# Patient Record
Sex: MALE | Race: WHITE | HISPANIC OR LATINO | Employment: OTHER | URBAN - METROPOLITAN AREA
[De-identification: names, ages, dates, MRNs, and addresses within clinical notes are randomized per-mention and may not be internally consistent; named-entity substitution may affect disease eponyms.]

---

## 2023-11-21 ENCOUNTER — HOSPITAL ENCOUNTER (EMERGENCY)
Facility: HOSPITAL | Age: 48
Discharge: HOME | End: 2023-11-21
Attending: EMERGENCY MEDICINE

## 2023-11-21 VITALS
OXYGEN SATURATION: 98 % | DIASTOLIC BLOOD PRESSURE: 96 MMHG | HEIGHT: 66 IN | WEIGHT: 232.25 LBS | SYSTOLIC BLOOD PRESSURE: 178 MMHG | TEMPERATURE: 98 F | RESPIRATION RATE: 18 BRPM | HEART RATE: 91 BPM | BODY MASS INDEX: 37.33 KG/M2

## 2023-11-21 DIAGNOSIS — M17.11 PRIMARY OSTEOARTHRITIS OF RIGHT KNEE: ICD-10-CM

## 2023-11-21 DIAGNOSIS — M25.561 KNEE PAIN, RIGHT: Primary | ICD-10-CM

## 2023-11-21 PROCEDURE — 99283 EMERGENCY DEPT VISIT LOW MDM: CPT

## 2023-11-21 PROCEDURE — 25000003 PHARM REV CODE 250: Performed by: NURSE PRACTITIONER

## 2023-11-21 RX ORDER — NAPROXEN 500 MG/1
500 TABLET ORAL
Status: COMPLETED | OUTPATIENT
Start: 2023-11-21 | End: 2023-11-21

## 2023-11-21 RX ORDER — DICLOFENAC SODIUM 50 MG/1
50 TABLET, DELAYED RELEASE ORAL 3 TIMES DAILY PRN
Qty: 15 TABLET | Refills: 0 | Status: SHIPPED | OUTPATIENT
Start: 2023-11-21

## 2023-11-21 RX ADMIN — NAPROXEN 500 MG: 500 TABLET ORAL at 09:11

## 2023-11-22 NOTE — ED PROVIDER NOTES
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 Ambulatory with steady gait.     Review of patient's allergies indicates:  No Known Allergies     HPI   The history is provided by the patient.   Leg Pain   [place] The injury mechanism was torsion (playing soccer). Illness onset: years ago. The pain is present in the right knee. The quality of the pain is described as aching. The pain is at a severity of 3/10. The pain has been Intermittent since onset. Pertinent negatives include no numbness, no inability to bear weight, no loss of motion, no muscle weakness, no loss of sensation and no tingling. He reports no foreign bodies present. The symptoms are aggravated by activity. He has tried nothing for the symptoms. The treatment provided no relief.        PCP: No primary care provider on file.     Past Medical History:  No past medical history on file.     Past Surgical History:  No past surgical history on file.     Family History:  No family history on file.     Social History:  Social History     Tobacco Use    Smoking status: Not on file    Smokeless tobacco: Not on file   Substance and Sexual Activity    Alcohol use: Not on file    Drug use: Not on file    Sexual activity: Not on file         ROS   Review of Systems   Constitutional:  Negative for fever.   HENT:  Negative for sore throat.    Respiratory:  Negative for shortness of breath.    Cardiovascular:  Negative for chest pain.   Gastrointestinal:  Negative for nausea.   Genitourinary:  Negative for dysuria.   Musculoskeletal:  Negative for back pain.        Right knee pain   Skin:  Negative for rash.   Neurological:  Negative for tingling, weakness and numbness.   Hematological:  Does not bruise/bleed easily.       PHYSICAL EXAM     Initial  "Vitals [11/21/23 1936]   BP Pulse Resp Temp SpO2   (!) 178/96 91 18 97.8 °F (36.6 °C) 98 %      MAP       --           Physical Exam    Constitutional: He appears well-developed and well-nourished. He is Obese . No distress.   HENT:   Head: Normocephalic and atraumatic.   Eyes: Conjunctivae are normal. Pupils are equal, round, and reactive to light.   Neck: Neck supple.   Normal range of motion.  Cardiovascular:  Normal rate, regular rhythm and normal heart sounds.           Pulmonary/Chest: Breath sounds normal.   Abdominal: Abdomen is soft. Bowel sounds are normal.   Musculoskeletal:         General: Normal range of motion.      Cervical back: Normal range of motion and neck supple.     Neurological: He is alert and oriented to person, place, and time. No cranial nerve deficit.   Skin: Skin is warm and dry.   Psychiatric: He has a normal mood and affect.          ED COURSE   Procedures  ED ONGOING VITALS:  Vitals:    11/21/23 1936   BP: (!) 178/96   Pulse: 91   Resp: 18   Temp: 97.8 °F (36.6 °C)   TempSrc: Oral   SpO2: 98%   Weight: 105.3 kg (232 lb 4.1 oz)   Height: 5' 6" (1.676 m)         ABNORMAL LAB VALUES:  Labs Reviewed - No data to display      ALL LAB VALUES:        RADIOLOGY STUDIES:  Imaging Results              X-Ray Knee Complete 4 Or More Views Right (Final result)  Result time 11/21/23 20:22:31      Final result by Nick Wayne MD (11/21/23 20:22:31)                   Impression:      As above      Electronically signed by: Nick Wayne  Date:    11/21/2023  Time:    20:22               Narrative:    EXAMINATION:  XR KNEE COMP 4 OR MORE VIEWS RIGHT    CLINICAL HISTORY:  Pain in right knee    TECHNIQUE:  AP, lateral, and Merchant views of the right knee were performed.    COMPARISON:  None    FINDINGS:  No acute fracture or dislocation.  Mild degenerative joint disease.                                                The above vital signs and test results have been reviewed by the emergency " provider.     ED Medications:  Discharge Medication List as of 11/21/2023  9:50 PM        START taking these medications    Details   diclofenac (VOLTAREN) 50 MG EC tablet Take 1 tablet (50 mg total) by mouth 3 (three) times daily as needed., Starting Tue 11/21/2023, Print           Discharge Medications:  Discharge Medication List as of 11/21/2023  9:50 PM        START taking these medications    Details   diclofenac (VOLTAREN) 50 MG EC tablet Take 1 tablet (50 mg total) by mouth 3 (three) times daily as needed., Starting Tue 11/21/2023, Print             Follow-up Information       Schedule an appointment as soon as possible for a visit  with PCP.               [facility] - Emergency Dept..    Specialty: Emergency Medicine  Contact information:  [id number] [place] [id number] [phone]                          I discussed with patient and/or family/caretaker that evaluation in the ED does not suggest any emergent or life threatening medical conditions requiring immediate intervention beyond what was provided in the ED, and I believe patient is safe for discharge. Regardless, an unremarkable evaluation in the ED does not preclude the development or presence of a serious or life threatening condition. As such, patient was instructed to return immediately for any worsening or change in current symptoms.    Pre-hypertension/Hypertension: The pt has been informed that they may have pre-hypertension or hypertension based on a blood pressure reading in the ED. I recommend that the pt call the PCP listed on their discharge instructions or a physician of their choice this week to arrange f/u for further evaluation of possible pre-hypertension or hypertension.       MEDICAL DECISION MAKING                 CLINICAL IMPRESSION       ICD-10-CM ICD-9-CM   1. Knee pain, right  M25.561 719.46   2. Primary osteoarthritis of right knee  M17.11 715.16       Disposition:   Disposition:  Discharged  Condition: Stable         Dario Dixon NP  11/22/23 [id number]

## 2024-05-08 ENCOUNTER — HOSPITAL ENCOUNTER (EMERGENCY)
Facility: HOSPITAL | Age: 49
Discharge: HOME | End: 2024-05-09
Attending: EMERGENCY MEDICINE

## 2024-05-08 VITALS
RESPIRATION RATE: 19 BRPM | TEMPERATURE: 98 F | OXYGEN SATURATION: 97 % | SYSTOLIC BLOOD PRESSURE: 164 MMHG | DIASTOLIC BLOOD PRESSURE: 100 MMHG | HEART RATE: 81 BPM

## 2024-05-08 DIAGNOSIS — T14.8XXA MUSCLE STRAIN: Primary | ICD-10-CM

## 2024-05-08 PROCEDURE — 63600175 PHARM REV CODE 636 W HCPCS: Performed by: NURSE PRACTITIONER

## 2024-05-08 PROCEDURE — 96372 THER/PROPH/DIAG INJ SC/IM: CPT | Performed by: NURSE PRACTITIONER

## 2024-05-08 PROCEDURE — 99285 EMERGENCY DEPT VISIT HI MDM: CPT | Mod: 25

## 2024-05-08 RX ORDER — KETOROLAC TROMETHAMINE 30 MG/ML
30 INJECTION, SOLUTION INTRAMUSCULAR; INTRAVENOUS
Status: COMPLETED | OUTPATIENT
Start: 2024-05-08 | End: 2024-05-08

## 2024-05-08 RX ORDER — METHOCARBAMOL 750 MG/1
750 TABLET, FILM COATED ORAL 4 TIMES DAILY
Qty: 40 TABLET | Refills: 0 | Status: SHIPPED | OUTPATIENT
Start: 2024-05-08 | End: 2024-05-18

## 2024-05-08 RX ORDER — IBUPROFEN 800 MG/1
800 TABLET ORAL EVERY 6 HOURS PRN
Qty: 20 TABLET | Refills: 0 | Status: SHIPPED | OUTPATIENT
Start: 2024-05-08

## 2024-05-08 RX ORDER — ORPHENADRINE CITRATE 30 MG/ML
60 INJECTION INTRAMUSCULAR; INTRAVENOUS
Status: COMPLETED | OUTPATIENT
Start: 2024-05-08 | End: 2024-05-08

## 2024-05-08 RX ADMIN — KETOROLAC TROMETHAMINE 30 MG: 30 INJECTION, SOLUTION INTRAMUSCULAR at 11:05

## 2024-05-08 RX ADMIN — ORPHENADRINE CITRATE 60 MG: 30 INJECTION, SOLUTION INTRAMUSCULAR; INTRAVENOUS at 11:05

## 2024-05-08 NOTE — Clinical Note
"Julius Vizcaino" Juan F De La Torre was seen and treated in our emergency department on 5/8/2024.  He may return to work on 05/11/2024.       If you have any questions or concerns, please don't hesitate to call.      Errol Renae NP"

## 2024-05-09 NOTE — ED PROVIDER NOTES
Encounter Date: 5/8/2024       History     Chief Complaint   Patient presents with    Back Pain     Pt c/o left side lower back pain that started yesterday. Pt states he was trying to climb out of a container.      Patient is a 49-year-old male who presents with left lower back pain.  Onset of symptoms today.  Reports trying to climb out of a dumpster and pulled a muscle in his back.  Denies any bowel or bladder incontinence, saddle anesthesia or numbness or tingling to lower extremities.  Denies any tenderness or pain to the middle of his back.  Patient shows no signs distress at this time.      Review of patient's allergies indicates:  No Known Allergies  No past medical history on file.  No past surgical history on file.  No family history on file.     Review of Systems   Constitutional:  Negative for fever.   HENT:  Negative for sore throat.    Respiratory:  Negative for shortness of breath.    Cardiovascular:  Negative for chest pain.   Gastrointestinal:  Negative for nausea.   Genitourinary:  Negative for dysuria.   Musculoskeletal:  Positive for back pain (left lower).   Skin:  Negative for rash.   Neurological:  Negative for weakness.   Hematological:  Does not bruise/bleed easily.       Physical Exam     Initial Vitals [05/08/24 2230]   BP Pulse Resp Temp SpO2   (!) 164/100 81 19 98.1 °F (36.7 °C) 97 %      MAP       --         Physical Exam    Nursing note and vitals reviewed.  Constitutional: He appears well-developed and well-nourished.   HENT:   Head: Normocephalic and atraumatic.   Eyes: Conjunctivae and EOM are normal. Pupils are equal, round, and reactive to light.   Neck: Neck supple.   Normal range of motion.  Cardiovascular:  Normal rate, regular rhythm, normal heart sounds and intact distal pulses.           Pulmonary/Chest: Breath sounds normal.   Abdominal: Abdomen is soft. Bowel sounds are normal.   Musculoskeletal:      Cervical back: Normal, normal range of motion and neck supple.       Thoracic back: Normal.      Lumbar back: Tenderness present. No bony tenderness. Decreased range of motion.        Back:      Neurological: He is alert and oriented to person, place, and time. He has normal strength and normal reflexes.   Skin: Skin is warm and dry. Capillary refill takes less than 2 seconds.   Psychiatric: He has a normal mood and affect. His behavior is normal. Judgment and thought content normal.         ED Course   Procedures  Labs Reviewed - No data to display       Imaging Results              CT Lumbar Spine Without Contrast (Final result)  Result time 05/08/24 23:05:55      Final result by Nick Wayne MD (05/08/24 23:05:55)                   Impression:      No acute fracture or dislocation.  Mild degenerative joint disease    All CT scans at this facility are performed  using dose modulation techniques as appropriate to performed exam including the following:  automated exposure control; adjustment of mA and/or kV according to the patients size (this includes techniques or standardized protocols for targeted exams where dose is matched to indication/reason for exam: i.e. extremities or head);  iterative reconstruction technique.      Electronically signed by: Nick Wayne  Date:    05/08/2024  Time:    23:05               Narrative:    EXAMINATION:  CT LUMBAR SPINE WITHOUT CONTRAST    CLINICAL HISTORY:  Low back pain, trauma;    TECHNIQUE:  CT lumbar spine without    COMPARISON:  None    FINDINGS:  No vertebral body compression deformity.  Normal bone mineral density.  Normal alignment of the vertebral bodies.  No soft tissue abnormality.  Mild degenerative disc disease.                                       Medications   ketorolac injection 30 mg (30 mg Intramuscular Given 5/8/24 8603)   orphenadrine injection 60 mg (60 mg Intramuscular Given 5/8/24 2333)     Medical Decision Making  I discussed with patient and/or family/caretaker that evaluation in the ED does not suggest any  emergent or life threatening medical conditions requiring immediate intervention beyond what was provided in the ED, and I believe patient is safe for discharge. Regardless, an unremarkable evaluation in the ED does not preclude the development or presence of a serious of life threatening condition. As such, patient was instructed to return immediately for any worsening or change in current symptoms.      Amount and/or Complexity of Data Reviewed  Radiology: ordered.    Risk  Prescription drug management.                                      Clinical Impression:  Final diagnoses:  [T14.8XXA] Muscle strain (Primary)          ED Disposition Condition    Discharge Stable          ED Prescriptions       Medication Sig Dispense Start Date End Date Auth. Provider    methocarbamoL (ROBAXIN) 750 MG Tab Take 1 tablet (750 mg total) by mouth 4 (four) times daily. for 10 days 40 tablet 5/8/2024 5/18/2024 Errol Renae NP    ibuprofen (ADVIL,MOTRIN) 800 MG tablet Take 1 tablet (800 mg total) by mouth every 6 (six) hours as needed for Pain. 20 tablet 5/8/2024 -- Errol Renae NP          Follow-up Information    None          Errol Renae NP  05/08/24 8579

## 2024-05-09 NOTE — ED NOTES
Bed: TL 02  Expected date:   Expected time:   Means of arrival: Personal Transportation  Comments:     Errol Renae, NP  05/08/24 9942

## 2024-06-28 ENCOUNTER — HOSPITAL ENCOUNTER (EMERGENCY)
Facility: HOSPITAL | Age: 49
Discharge: HOME | End: 2024-06-28
Attending: EMERGENCY MEDICINE

## 2024-06-28 VITALS
BODY MASS INDEX: 35.51 KG/M2 | HEART RATE: 88 BPM | OXYGEN SATURATION: 97 % | DIASTOLIC BLOOD PRESSURE: 108 MMHG | RESPIRATION RATE: 18 BRPM | WEIGHT: 220 LBS | SYSTOLIC BLOOD PRESSURE: 185 MMHG | TEMPERATURE: 98 F

## 2024-06-28 DIAGNOSIS — M54.42 ACUTE BACK PAIN WITH SCIATICA, LEFT: Primary | ICD-10-CM

## 2024-06-28 PROCEDURE — 63600175 PHARM REV CODE 636 W HCPCS: Performed by: NURSE PRACTITIONER

## 2024-06-28 PROCEDURE — 99284 EMERGENCY DEPT VISIT MOD MDM: CPT | Mod: 25

## 2024-06-28 PROCEDURE — 25000003 PHARM REV CODE 250: Performed by: NURSE PRACTITIONER

## 2024-06-28 PROCEDURE — 96372 THER/PROPH/DIAG INJ SC/IM: CPT | Performed by: NURSE PRACTITIONER

## 2024-06-28 RX ORDER — PREDNISONE 50 MG/1
50 TABLET ORAL DAILY
Qty: 5 TABLET | Refills: 0 | Status: SHIPPED | OUTPATIENT
Start: 2024-06-28 | End: 2024-07-03

## 2024-06-28 RX ORDER — BUTALBITAL, ACETAMINOPHEN AND CAFFEINE 50; 325; 40 MG/1; MG/1; MG/1
1 TABLET ORAL EVERY 6 HOURS PRN
Qty: 15 TABLET | Refills: 0 | Status: SHIPPED | OUTPATIENT
Start: 2024-06-28

## 2024-06-28 RX ORDER — MORPHINE SULFATE 4 MG/ML
6 INJECTION, SOLUTION INTRAMUSCULAR; INTRAVENOUS
Status: COMPLETED | OUTPATIENT
Start: 2024-06-28 | End: 2024-06-28

## 2024-06-28 RX ORDER — KETOROLAC TROMETHAMINE 10 MG/1
10 TABLET, FILM COATED ORAL EVERY 6 HOURS PRN
Qty: 15 TABLET | Refills: 0 | Status: SHIPPED | OUTPATIENT
Start: 2024-06-28

## 2024-06-28 RX ORDER — DEXAMETHASONE SODIUM PHOSPHATE 4 MG/ML
8 INJECTION, SOLUTION INTRA-ARTICULAR; INTRALESIONAL; INTRAMUSCULAR; INTRAVENOUS; SOFT TISSUE
Status: COMPLETED | OUTPATIENT
Start: 2024-06-28 | End: 2024-06-28

## 2024-06-28 RX ORDER — ONDANSETRON 8 MG/1
8 TABLET, ORALLY DISINTEGRATING ORAL
Status: COMPLETED | OUTPATIENT
Start: 2024-06-28 | End: 2024-06-28

## 2024-06-28 RX ADMIN — ONDANSETRON 8 MG: 8 TABLET, ORALLY DISINTEGRATING ORAL at 10:06

## 2024-06-28 RX ADMIN — DEXAMETHASONE SODIUM PHOSPHATE 8 MG: 4 INJECTION INTRA-ARTICULAR; INTRALESIONAL; INTRAMUSCULAR; INTRAVENOUS; SOFT TISSUE at 09:06

## 2024-06-28 RX ADMIN — MORPHINE SULFATE 6 MG: 4 INJECTION INTRAVENOUS at 10:06

## 2024-06-28 NOTE — ED PROVIDER NOTES
Encounter Date: 6/28/2024       History     Chief Complaint   Patient presents with    Leg Pain     Pt c/o left leg pain/numbness and lower back pain seen here previously for same complaint. Pt appears very uncomfortable in wheelchair.      49 year old male with complaint of left lower back pain with radiation into left lower leg X one month.  Pt reports pain worsened today.  No loss of bowel or bladder function.  NO fever or chills. Pt reports pain worse with movement.          Review of patient's allergies indicates:  No Known Allergies  No past medical history on file.  No past surgical history on file.  No family history on file.     Review of Systems   Constitutional:  Negative for fever.   HENT:  Negative for sore throat.    Respiratory:  Negative for shortness of breath.    Cardiovascular:  Negative for chest pain.   Gastrointestinal:  Negative for nausea.   Genitourinary:  Negative for dysuria.   Musculoskeletal:  Positive for back pain.   Skin:  Negative for rash.   Neurological:  Negative for weakness.   Hematological:  Does not bruise/bleed easily.       Physical Exam     Initial Vitals [06/28/24 0853]   BP Pulse Resp Temp SpO2   (!) 185/108 88 18 98.3 °F (36.8 °C) 97 %      MAP       --         Physical Exam    Nursing note and vitals reviewed.  Constitutional: He appears well-developed and well-nourished.   HENT:   Head: Normocephalic and atraumatic.   Eyes: Conjunctivae are normal. Pupils are equal, round, and reactive to light.   Neck: Neck supple.   Normal range of motion.  Cardiovascular:  Normal rate, regular rhythm, normal heart sounds and intact distal pulses.           Pulmonary/Chest: Breath sounds normal.   Abdominal: Abdomen is soft. There is no rebound and no guarding.   Musculoskeletal:         General: Normal range of motion.      Cervical back: Normal range of motion and neck supple.      Comments: Left lower lumbar tenderness, left straight leg positive at 45 degrees, able to dorsi and  plantar flex left foot without difficulty     Neurological: He is alert and oriented to person, place, and time. He has normal strength.   Skin: Skin is warm and dry.   Psychiatric: He has a normal mood and affect. His behavior is normal. Thought content normal.         ED Course   Procedures  Labs Reviewed - No data to display       Imaging Results              X-Ray Lumbar Spine Ap And Lateral (Final result)  Result time 06/28/24 09:28:25      Final result by Matteo Kurtz MD (06/28/24 09:28:25)                   Impression:      1.  Negative for acute process involving the lumbar spine.    2.  Mild degenerative changes of L3/L4 disc level.      Electronically signed by: Matteo Kurtz MD  Date:    06/28/2024  Time:    09:28               Narrative:    EXAMINATION:  XR LUMBAR SPINE AP AND LATERAL    CLINICAL HISTORY:  back pain;    COMPARISON:  No comparison studies are available.    FINDINGS:  There are 5 weight bearing lumbar vertebra.  Superior L4 endplate marginal spondylosis.  The vertebral body heights and intervertebral disc heights are   well-maintained. Negative for spondylolysis or spondylolisthesis. The sacral ala and sacroiliac joints are intact. The bowel gas pattern is normal.                                       Imaging Results              X-Ray Lumbar Spine Ap And Lateral (Final result)  Result time 06/28/24 09:28:25      Final result by Matteo Kurtz MD (06/28/24 09:28:25)                   Impression:      1.  Negative for acute process involving the lumbar spine.    2.  Mild degenerative changes of L3/L4 disc level.      Electronically signed by: Matteo Kurtz MD  Date:    06/28/2024  Time:    09:28               Narrative:    EXAMINATION:  XR LUMBAR SPINE AP AND LATERAL    CLINICAL HISTORY:  back pain;    COMPARISON:  No comparison studies are available.    FINDINGS:  There are 5 weight bearing lumbar vertebra.  Superior L4 endplate marginal spondylosis.  The vertebral body heights and  intervertebral disc heights are   well-maintained. Negative for spondylolysis or spondylolisthesis. The sacral ala and sacroiliac joints are intact. The bowel gas pattern is normal.                                      Medications   ondansetron disintegrating tablet 8 mg (has no administration in time range)   dexAMETHasone injection 8 mg (8 mg Intramuscular Given 6/28/24 0958)   morphine injection 6 mg (6 mg Intramuscular Given 6/28/24 1000)     Medical Decision Making  Amount and/or Complexity of Data Reviewed  Radiology: ordered.    Risk  Prescription drug management.                                      Clinical Impression:  Final diagnoses:  [M54.42] Acute back pain with sciatica, left (Primary)                 Luis A Manley, NP  06/28/24 1002

## 2024-06-28 NOTE — Clinical Note
"Julius Meyers (Erick)a was seen and treated in our emergency department on 6/28/2024.  He may return to work on 07/02/2024.       If you have any questions or concerns, please don't hesitate to call.           " yes...

## 2024-06-28 NOTE — Clinical Note
"Julius Meyers (Erick)a was seen and treated in our emergency department on 6/28/2024.  He may return to work on 07/02/2024.       If you have any questions or concerns, please don't hesitate to call.           "

## 2024-11-04 ENCOUNTER — PATIENT MESSAGE (OUTPATIENT)
Dept: RESEARCH | Facility: HOSPITAL | Age: 49
End: 2024-11-04